# Patient Record
(demographics unavailable — no encounter records)

---

## 2025-05-23 NOTE — PHYSICAL EXAM
[No Acute Distress] : no acute distress [Well Nourished] : well nourished [EOMI] : extraocular movements intact [Supple] : supple [Clear to Auscultation] : lungs were clear to auscultation bilaterally [Normal S1, S2] : normal S1 and S2 [No Edema] : there was no peripheral edema [Non Tender] : non-tender [Normal Bowel Sounds] : normal bowel sounds [No Rash] : no rash [Normal Gait] : normal gait [Normal Affect] : the affect was normal

## 2025-05-23 NOTE — HISTORY OF PRESENT ILLNESS
[FreeTextEntry1] : comprehensive visit and health concerns.  [de-identified] : Mr. Karthik Perdomo. 20 yo male presents today for annual comprehensive exam and labs. Pt reports today overall doing poor, states more depressed lately and anxious, as he has done well in school. However, pt does disclose, depression anxiety, has been going on lot longer. Pt has had challenges his gender identity, born male, but considers female. Pt seeks for more gender transitioning resources. Pt advised today will need address the anxiety/depression, currently not amenable for medication for anxiety/depression, but willing to follow up with therapist. Advised today also resources for [facility].

## 2025-05-23 NOTE — ASSESSMENT
[Vaccines Reviewed] : Immunizations reviewed today. Please see immunization details in the vaccine log within the immunization flowsheet.  [FreeTextEntry1] : completed physical exam, blood work and urinalysis ordered today, will follow up accordingly. HCM : up to date vaccine Tdap up to date, flu vaccine deferred, COVID-19 vaccine up to date. Advised to get annual booster vaccine. Moderate depression/anxiety, referral to behavioral therapist provided referral to [facility]TO in July for follow up.